# Patient Record
Sex: FEMALE | Race: WHITE | NOT HISPANIC OR LATINO | Employment: OTHER | URBAN - METROPOLITAN AREA
[De-identification: names, ages, dates, MRNs, and addresses within clinical notes are randomized per-mention and may not be internally consistent; named-entity substitution may affect disease eponyms.]

---

## 2024-06-07 ENCOUNTER — HOSPITAL ENCOUNTER (OUTPATIENT)
Dept: RADIOLOGY | Facility: HOSPITAL | Age: 49
Discharge: HOME | End: 2024-06-07
Payer: COMMERCIAL

## 2024-06-07 ENCOUNTER — OFFICE VISIT (OUTPATIENT)
Dept: ORTHOPEDIC SURGERY | Facility: HOSPITAL | Age: 49
End: 2024-06-07
Payer: COMMERCIAL

## 2024-06-07 VITALS — BODY MASS INDEX: 30.16 KG/M2 | WEIGHT: 199 LBS | HEIGHT: 68 IN

## 2024-06-07 DIAGNOSIS — M25.561 BILATERAL ANTERIOR KNEE PAIN: ICD-10-CM

## 2024-06-07 DIAGNOSIS — M22.2X2 PATELLOFEMORAL SYNDROME, BILATERAL: Primary | ICD-10-CM

## 2024-06-07 DIAGNOSIS — M25.562 BILATERAL ANTERIOR KNEE PAIN: ICD-10-CM

## 2024-06-07 DIAGNOSIS — M22.2X1 PATELLOFEMORAL SYNDROME, BILATERAL: Primary | ICD-10-CM

## 2024-06-07 PROCEDURE — 73562 X-RAY EXAM OF KNEE 3: CPT | Mod: RT

## 2024-06-07 PROCEDURE — 73560 X-RAY EXAM OF KNEE 1 OR 2: CPT | Mod: RIGHT SIDE | Performed by: RADIOLOGY

## 2024-06-07 PROCEDURE — 1036F TOBACCO NON-USER: CPT | Performed by: SPECIALIST/TECHNOLOGIST

## 2024-06-07 PROCEDURE — 99204 OFFICE O/P NEW MOD 45 MIN: CPT | Performed by: SPECIALIST/TECHNOLOGIST

## 2024-06-07 PROCEDURE — 73560 X-RAY EXAM OF KNEE 1 OR 2: CPT | Mod: LT

## 2024-06-07 PROCEDURE — 99214 OFFICE O/P EST MOD 30 MIN: CPT | Performed by: SPECIALIST/TECHNOLOGIST

## 2024-06-07 PROCEDURE — 73562 X-RAY EXAM OF KNEE 3: CPT | Mod: RIGHT SIDE | Performed by: RADIOLOGY

## 2024-06-07 RX ORDER — MELOXICAM 15 MG/1
15 TABLET ORAL DAILY
Qty: 14 TABLET | Refills: 0 | Status: SHIPPED | OUTPATIENT
Start: 2024-06-07 | End: 2024-06-21

## 2024-06-07 ASSESSMENT — PAIN SCALES - GENERAL: PAINLEVEL_OUTOF10: 4

## 2024-06-07 ASSESSMENT — PAIN - FUNCTIONAL ASSESSMENT: PAIN_FUNCTIONAL_ASSESSMENT: 0-10

## 2024-06-07 ASSESSMENT — PAIN DESCRIPTION - DESCRIPTORS: DESCRIPTORS: TIGHTNESS

## 2024-06-07 NOTE — PROGRESS NOTES
Chief Complaint: Pain of the Right Knee and Pain of the Left Knee       HPI:  Diane Smith is a 48 y.o. self-employed female presenting to the orthopedic walk-in clinic with bilateral knee pain, right equals left, since December 2023 after beginning a bar workout class.  She reports going 5-6 days/week.  And has continued since December working through the pain.  Today, she reports a tight, sharp ache over the anterior aspect of her knee that worsens with squatting, lunging, stairs, sitting and knee flexion.  She has been performing stretches to try to help relieve her symptoms.  She denies prior injuries to the bilateral knees.  She denies numbness or tingling in the bilateral lower extremities.  She presents for treatment recommendations.    Objective     ROS:  Constitutional: No fever, no chills, not feeling tired, no recent weight gain and no recent weight loss  ENT: No nosebleeds  Cardiovascular: No chest pain  Respiratory: No shortness of breath and no cough  Gastrointestinal: No abdominal pain, no nausea, no diarrhea, and no vomiting  Musculoskeletal: Positive for bilateral knee pain  Integumentary: No rashes and no skin lesions  Neurological: No headache  Psychiatric: No sleep disturbances no depression  Endocrine: No muscle weakness and no muscle cramps  Hematologic/lymphatic: No swelling glands and no tendency for easy bruising    No past medical history on file.     Past Surgical History:   Procedure Laterality Date    OTHER SURGICAL HISTORY  11/09/2022    Dilation and curettage        Social Connections: Not on file          Physical Exam:  General appearance: WN, WD female, in no acute distress  Skin: No rashes, lesions or wounds  Head: Normocephalic, no evidence of trauma  Eye: EOMI, conjunctiva clear, no discharge  ENT: Nares patent  Neck: No abnormal contour, tracheal midline  Chest/lungs: No respiratory distress, speaking in complete sentences  Musculoskeletal: RIGHT KNEE: Tender to palpation  over the medial and lateral joint line, medial patellar border.  Stable Lachman's.  Stable valgus stress test.  Stable varus stress test.  Negative Chang.  She is able to perform an active straight leg raise without difficulty or lag.  Range of motion to 130 degrees.  Trace effusion.  LEFT KNEE: Tender to palpation over the medial and lateral joint line.  Stable Lachman's.  Stable valgus stress test.  Stable varus stress test.  Negative Chang.  She is able to perform an active straight leg raise without difficulty or lag.  Range of motion to 130 degrees.  Trace effusion.  Patellofemoral crepitus noted bilaterally, left greater than right.  No decreased ROM, muscle wasting, rigidity    Neurological: A&O x3, no focal deficits, intact bilateral LE  Psych: normal affect, mood, appearance      Image Results:  X-rays taken on 6/7/2024 were reviewed with the patient in the office today and reveal no acute fractures or dislocations.  There is evidence of mild osteoarthritic changes seen in the medial compartment and the patellofemoral compartments bilaterally.      Assessment/Plan   Encounter Diagnoses:  Patellofemoral syndrome, bilateral    Bilateral anterior knee pain    Orders Placed This Encounter    XR knee right 3 views    XR knee left 1-2 views    Referral to Physical Therapy       The patient and I discussed her clinical presentation and physical exam findings consistent with bilateral patellofemoral syndrome.  We agreed upon conservative management at this time.  Since her symptoms have been persistent since the starting of her workout regimen and continued with 5-6 days/week of working out, I did advise her to slow down her workout regimen and only do it once or twice a week as long as her pain is not increasing.  Additionally we agreed upon meloxicam 15 mg taken daily with food for the next 14 days.  This was sent to her pharmacy for her.  She was provided with a referral to physical therapy to focus on  quadriceps, gluteus, core strength and stability, lower extremity flexibility.  Activity and weight room modifications were discussed with the patient in the office today.  She should avoid stair climber, box jumps, box step ups, lunges, squatting.  I encouraged her to ice her knees 15-20 minutes at a time 2-3 times per day.  I am happy to see her back in the next 6-8 weeks if her symptoms persist or worsen.  She is in agreement this plan.  All of her questions have been answered.    ** This office note was dictated using Dragon voice to text software and was not proofread for spelling or grammatical errors **

## 2024-06-28 ENCOUNTER — EVALUATION (OUTPATIENT)
Dept: PHYSICAL THERAPY | Facility: CLINIC | Age: 49
End: 2024-06-28
Payer: COMMERCIAL

## 2024-06-28 DIAGNOSIS — M22.2X1 PATELLOFEMORAL SYNDROME, BILATERAL: ICD-10-CM

## 2024-06-28 DIAGNOSIS — M25.561 RIGHT KNEE PAIN: Primary | ICD-10-CM

## 2024-06-28 DIAGNOSIS — M22.2X2 PATELLOFEMORAL SYNDROME, BILATERAL: ICD-10-CM

## 2024-06-28 DIAGNOSIS — M25.562 ACUTE PAIN OF LEFT KNEE: ICD-10-CM

## 2024-06-28 PROCEDURE — 97162 PT EVAL MOD COMPLEX 30 MIN: CPT | Mod: GP | Performed by: PHYSICAL THERAPIST

## 2024-06-28 NOTE — PROGRESS NOTES
Physical Therapy Orthopedic Examination Note    Patient Name: Diane Smith  MRN Number: [id number]  Initial Examination Date: 6/28/24  Referring Clinician: Larry Smith PA-C  Reason for Visit: Bilateral knee pain    Time Calculation  Start Time: 0705  Stop Time: 0748  Time Calculation (min): 43 min    Insurance  Visit Number: 1   Approved Visits: 25 then auth  Certification/ POC Period: 6/28/24 - 8/30/24  Coverage: Payor: AETNA / Plan: AETNA  HEALTHCARE / Product Type: *No Product type* /     Precautions/ History  No fall risk.     Problem List  1. Right knee pain  Follow Up In Physical Therapy      2. Acute pain of left knee  Follow Up In Physical Therapy      3. Patellofemoral syndrome, bilateral  Referral to Physical Therapy        Subjective  Right ankle not feel right. Bilateral knee pain. Hamstring tightness. History of plantar fasciitis. Does have some issues with form during backwards lunges with RLE.     Aggravating Factors: exercise.     Observation:  Lateral step downs: adduction with LLE weight bearing    Objective  Other Measures  Other Outcome Measures: LEFS = 30    Knee AROM   Left  Right  Comments  Flexion   WNL  WNL  Extension  WNL  WNL    Hip PROM   Left  Right  Comments  Flexion   WNL  WNL  External rotation  WNL  WNL  Internal rotation WNL  WNL    Hip Strength Testing  Left  Right  Comments  Flexion   4+/5  4+/5  Extension KS  4/5  4/5  Extension KB  4/5  4/5  Abduction  4+/5  4-/5  External rotation  4+/5  4+/5  Internal rotation 4+/5  4/5     Hip Special Test  Left  Right  Comments  Elys    (-)  (-)    Toe Passive ROM  Left  Right   Great Toe  WNL  Hypomobile    Assessment  Referred to physical therapy for complaints of bilateral knee pain and right ankle pain.  Clinical examination consistent with patellofemoral pain syndrome and right great toe tightness is contributing to dysfunction with the ankle during higher level physical activities.  Skilled PT services will emphasize  stretching/mobility, manual care and strengthening in appropriate areas.    Problem List  -Functional limitations/difficulties  -Reduction in lower extremity function  -Bilateral knee pain  -Hip weakness  -Great toe hypomobility    Plan  Check hamstring flexibility.     Planned interventions include: Patient education, Home exercise program, Therapeutic exercise , Manual therapy, and Neuromuscular re-education. Right ankle/ toe manual mobilization, adductor strengthening, rotater strengthening, lateral step downs.     1 Visits/ Week for 8 Weeks    Home Program  To be provided on future sessions.    Goals  - Full return to prior level of function to allow continued working capability and ability to perform normal healthy exercising.  - Increase LEFS outcome measure score, greater than or equal to 65 for clinical improvements in lower body condition.  - Reduce bilateral pain less than or equal to 3/10 maximum in the last week for improvements in quality of life and ability to sleep.  - Increase bilateral hip strength testing greater than or equal to 5-/5 for improvements in LE stability when standing and walking for extended periods of time.    - Full passive right great toe mobility for normalization of movement during exercise.    Plan developed in agreement with patient.

## 2024-07-16 ENCOUNTER — TREATMENT (OUTPATIENT)
Dept: PHYSICAL THERAPY | Facility: CLINIC | Age: 49
End: 2024-07-16
Payer: COMMERCIAL

## 2024-07-16 DIAGNOSIS — M25.562 ACUTE PAIN OF LEFT KNEE: ICD-10-CM

## 2024-07-16 DIAGNOSIS — M25.561 RIGHT KNEE PAIN: ICD-10-CM

## 2024-07-16 PROCEDURE — 97110 THERAPEUTIC EXERCISES: CPT | Mod: GP | Performed by: PHYSICAL THERAPIST

## 2024-07-16 PROCEDURE — 97140 MANUAL THERAPY 1/> REGIONS: CPT | Mod: GP | Performed by: PHYSICAL THERAPIST

## 2024-07-16 NOTE — PROGRESS NOTES
Physical Therapy Orthopedic Examination Note    Patient Name: Diane Smith  MRN Number: [id number]  Initial Examination Date: 6/28/24  Referring Clinician: Larry Smith PA-C  Reason for Visit: Bilateral knee pain    Time Calculation  Start Time: 1034  Stop Time: 1116  Time Calculation (min): 42 min    Insurance  Visit Number: 1   Approved Visits: 25 then auth  Certification/ POC Period: 6/28/24 - 8/30/24  Coverage: Payor: AETNA / Plan: AETNA  HEALTHCARE / Product Type: *No Product type* /     Precautions/ History  No fall risk.     Problem List  1. Right knee pain  Follow Up In Physical Therapy      2. Acute pain of left knee  Follow Up In Physical Therapy        Subjective  A lot of walking last week. Doing BAR less. Right ankle feels feels. Stretching her toe out. Toe of her toe, near the nail feels weird. Can feel some clicking into her foot, then her movement will increase. Stress into her toes. Is working on stretching her toe, sometimes it pops then she has more mobility.     Treatment  Therapeutic Exercise   - hamstring flexibility, WNL   - lunges   - lateral step downs   - great toe self mobilization in half kneeling   - supine single leg bridge   - HEP printed/ issued/ discussed     Manual Therapy   - passive right great toe flexion/ extension   - right MTP AP mobilizations    Assessment  Improved knee and ankle control during lunges and lateral step downs.     Plan  Re-assess current program.     Planned interventions include: Patient education, Home exercise program, Therapeutic exercise , Manual therapy, and Neuromuscular re-education. Right ankle/ toe manual mobilization, adductor strengthening, rotater strengthening, lateral step downs.     1 Visits/ Week for 8 Weeks    Home Program  Access Code: [email or web address]  URL: [email or web address]  Date: 07/16/2024  Prepared by: Jackson Cast    Exercises  - Toe Extension Self-Mobilization  - 3 x weekly - 10-20 reps - 5 sec hold  -  Seated Self Great Toe Stretch  - 3 x weekly - 5 reps - 30 sec hold  - Lateral Step Down  - 3 x weekly - 3 sets - 10-20 reps  - Standard Lunge  - 3 x weekly - 10-20 reps  - Single Leg Bridge  - 3 x weekly - 2 sets - 10-20 reps  - Single Leg Bridge  - 3 x weekly - 2 sets - 10-20 reps    Goals  - Full return to prior level of function to allow continued working capability and ability to perform normal healthy exercising.  - Increase LEFS outcome measure score, greater than or equal to 65 for clinical improvements in lower body condition.  - Reduce bilateral pain less than or equal to 3/10 maximum in the last week for improvements in quality of life and ability to sleep.  - Increase bilateral hip strength testing greater than or equal to 5-/5 for improvements in LE stability when standing and walking for extended periods of time.    - Full passive right great toe mobility for normalization of movement during exercise.    Plan developed in agreement with patient.

## 2024-08-01 ENCOUNTER — TREATMENT (OUTPATIENT)
Dept: PHYSICAL THERAPY | Facility: CLINIC | Age: 49
End: 2024-08-01
Payer: COMMERCIAL

## 2024-08-01 DIAGNOSIS — M25.561 RIGHT KNEE PAIN: ICD-10-CM

## 2024-08-01 DIAGNOSIS — M25.562 ACUTE PAIN OF LEFT KNEE: ICD-10-CM

## 2024-08-01 PROCEDURE — 97140 MANUAL THERAPY 1/> REGIONS: CPT | Mod: GP | Performed by: PHYSICAL THERAPIST

## 2024-08-01 PROCEDURE — 97110 THERAPEUTIC EXERCISES: CPT | Mod: GP | Performed by: PHYSICAL THERAPIST

## 2024-08-01 NOTE — PROGRESS NOTES
Physical Therapy Orthopedic Examination Note    Patient Name: Diane Smith  MRN Number: [id number]  Initial Examination Date: 6/28/24  Referring Clinician: Larry Smith PA-C  Reason for Visit: Bilateral knee pain    Time Calculation  Start Time: 0904  Stop Time: 0946  Time Calculation (min): 42 min    Insurance  Visit Number: 1   Approved Visits: 25 then auth  Certification/ POC Period: 6/28/24 - 8/30/24  Coverage: Payor: AETNA / Plan: AETNA  HEALTHCARE / Product Type: *No Product type* /     Precautions/ History  No fall risk.     Problem List  1. Right knee pain  Follow Up In Physical Therapy      2. Acute pain of left knee  Follow Up In Physical Therapy        Subjective  Not always having numbness in the toe, which is an improvement. Back to planking. Knees are a little bit sore, has not been bothering her knee. Has not tried to ice them. Felt soreness in her knees after excessive amount of squatting in an exercise class. Knees seem to be doing better too, because she is doing less of the workouts. Still having cramping of the hamstrings continues to be an issue.     Treatment  Manual Therapy   - passive right great toe flexion/ extension   - right MTP AP mobilizations   - great toe long axis distraction    Therapeutic Exercise   - lateral step downs, 6 inch box   - lateral step down, 8 inch box   - discussed half kneeling exercise replacing with a squatting toe mobilization   - lunges   - lunges, 10 pound dumbbell on shoulders and by side   - lunges, single UE, no harder than double   - single leg bridge    - discussed foot position in relation to knee, heel in line with superior knee cap on opposite LE   - self PF stretch   - HEP printed/ issued/ discussed     Assessment  Improved form with step backs noted on lunges. Toe mobility improving.    Plan  Continue strengthening and manual care.    Planned interventions include: Patient education, Home exercise program, Therapeutic exercise , Manual therapy,  and Neuromuscular re-education. Right ankle/ toe manual mobilization, adductor strengthening, rotater strengthening, lateral step downs.     1 Visits/ Week for 8 Weeks    Home Program  Access Code: [email or web address]  URL: [email or web address]  Date: 07/16/2024  Prepared by: Jackson Cast    Exercises  - Toe Extension Self-Mobilization  - 3 x weekly - 10-20 reps - 5 sec hold  - Seated Self Great Toe Stretch  - 3 x weekly - 5 reps - 30 sec hold  - Lateral Step Down  - 3 x weekly - 3 sets - 10-20 reps  - Standard Lunge  - 3 x weekly - 10-20 reps  - Single Leg Bridge  - 3 x weekly - 2 sets - 10-20 reps  - Single Leg Bridge  - 3 x weekly - 2 sets - 10-20 reps    Goals  - Full return to prior level of function to allow continued working capability and ability to perform normal healthy exercising.  - Increase LEFS outcome measure score, greater than or equal to 65 for clinical improvements in lower body condition.  - Reduce bilateral pain less than or equal to 3/10 maximum in the last week for improvements in quality of life and ability to sleep.  - Increase bilateral hip strength testing greater than or equal to 5-/5 for improvements in LE stability when standing and walking for extended periods of time.    - Full passive right great toe mobility for normalization of movement during exercise.    Plan developed in agreement with patient.

## 2024-08-07 ENCOUNTER — TREATMENT (OUTPATIENT)
Dept: PHYSICAL THERAPY | Facility: CLINIC | Age: 49
End: 2024-08-07
Payer: COMMERCIAL

## 2024-08-07 DIAGNOSIS — M25.561 RIGHT KNEE PAIN: ICD-10-CM

## 2024-08-07 DIAGNOSIS — M25.562 ACUTE PAIN OF LEFT KNEE: ICD-10-CM

## 2024-08-07 PROCEDURE — 97140 MANUAL THERAPY 1/> REGIONS: CPT | Mod: GP | Performed by: PHYSICAL THERAPIST

## 2024-08-07 PROCEDURE — 97110 THERAPEUTIC EXERCISES: CPT | Mod: GP | Performed by: PHYSICAL THERAPIST

## 2024-08-07 NOTE — PROGRESS NOTES
Physical Therapy Orthopedic Examination Note    Patient Name: Diane Simth  MRN Number: [id number]  Initial Examination Date: 6/28/24  Referring Clinician: Larry Smith PA-C  Reason for Visit: Bilateral knee pain    Time Calculation  Start Time: 1002  Stop Time: 1046  Time Calculation (min): 44 min    Insurance  Visit Number: 4  Approved Visits: 25 then auth  Certification/ POC Period: 6/28/24 - 8/30/24  Coverage: Payor: AETNA / Plan: AETNA  HEALTHCARE / Product Type: *No Product type* /     Precautions/ History  No fall risk.     Problem List  1. Right knee pain  Follow Up In Physical Therapy      2. Acute pain of left knee  Follow Up In Physical Therapy        Subjective  Not always having numbness in the toe, which is an improvement. Back to planking. Knees are a little bit sore. Has not tried to ice them. Felt soreness in her knees after excessive amount of squatting in an exercise class. Knees seem to be doing better too, because she is doing less of the workouts. Still having cramping of the hamstrings continues to be an issue.     Treatment  Manual Therapy   - passive right great toe flexion/ extension   - right MTP AP mobilizations   - great toe long axis distraction    Therapeutic Exercise   - supine hamstring stretch   - supine hip flexor stretch    Assessment  Improved form with step backs noted on lunges. Toe mobility improving.    Plan  Continue strengthening and manual care.    Planned interventions include: Patient education, Home exercise program, Therapeutic exercise , Manual therapy, and Neuromuscular re-education. Right ankle/ toe manual mobilization, adductor strengthening, rotater strengthening, lateral step downs.     1 Visits/ Week for 8 Weeks    Home Program  Access Code: [email or web address]  URL: [email or web address]  Date: 07/16/2024  Prepared by: Jackson Cast    Exercises  - Toe Extension Self-Mobilization  - 3 x weekly - 10-20 reps - 5 sec hold  - Seated Self Great  Toe Stretch  - 3 x weekly - 5 reps - 30 sec hold  - Lateral Step Down  - 3 x weekly - 3 sets - 10-20 reps  - Standard Lunge  - 3 x weekly - 10-20 reps  - Single Leg Bridge  - 3 x weekly - 2 sets - 10-20 reps  - Single Leg Bridge  - 3 x weekly - 2 sets - 10-20 reps  -------------------------------------------------------------------------------------------------------------------------  Access Code: [email or web address]  URL: [email or web address]  Date: 08/7/2024  Prepared by: Jackson Cast    Exercises  - Toe Extension Self-Mobilization  - 3 x weekly - 10-20 reps - 5 sec hold  - Seated Plantar Fascia Stretch  - 3 x weekly - 5 reps - 30 sec hold  - Seated Self Great Toe Stretch  - 3 x weekly - 5 reps - 30 sec hold  - Lateral Step Down  - 3 x weekly - 3 sets - 10-20 reps  - Standard Lunge  - 3 x weekly - 3 sets - 10-20 reps  - Single Leg Bridge  - 3 x weekly - 2 sets - 10-20 reps  - Single Leg Bridge  - 3 x weekly - 2 sets - 10-20 reps  - Supine Hamstring Stretch with Strap  - 3 x weekly - 3 reps - 60 sec hold  - Standing Hip Flexor Stretch  - 3 x weekly - 3 reps - 60 sec hold    Goals  - Full return to prior level of function to allow continued working capability and ability to perform normal healthy exercising.  - Increase LEFS outcome measure score, greater than or equal to 65 for clinical improvements in lower body condition.  - Reduce bilateral pain less than or equal to 3/10 maximum in the last week for improvements in quality of life and ability to sleep.  - Increase bilateral hip strength testing greater than or equal to 5-/5 for improvements in LE stability when standing and walking for extended periods of time.    - Full passive right great toe mobility for normalization of movement during exercise.    Plan developed in agreement with patient.

## 2024-08-15 ENCOUNTER — APPOINTMENT (OUTPATIENT)
Dept: PHYSICAL THERAPY | Facility: CLINIC | Age: 49
End: 2024-08-15
Payer: COMMERCIAL

## 2024-08-21 ENCOUNTER — HOSPITAL ENCOUNTER (OUTPATIENT)
Dept: RADIOLOGY | Facility: CLINIC | Age: 49
Discharge: HOME | End: 2024-08-21
Payer: COMMERCIAL

## 2024-08-21 VITALS — BODY MASS INDEX: 30.17 KG/M2 | WEIGHT: 199.08 LBS | HEIGHT: 68 IN

## 2024-08-21 DIAGNOSIS — Z12.31 SCREENING MAMMOGRAM FOR BREAST CANCER: ICD-10-CM

## 2024-08-21 PROCEDURE — 77067 SCR MAMMO BI INCL CAD: CPT | Performed by: RADIOLOGY

## 2024-08-21 PROCEDURE — 77063 BREAST TOMOSYNTHESIS BI: CPT | Performed by: RADIOLOGY

## 2024-08-21 PROCEDURE — 77067 SCR MAMMO BI INCL CAD: CPT

## 2024-08-22 ENCOUNTER — APPOINTMENT (OUTPATIENT)
Dept: PHYSICAL THERAPY | Facility: CLINIC | Age: 49
End: 2024-08-22
Payer: COMMERCIAL

## 2024-11-17 ASSESSMENT — PROMIS GLOBAL HEALTH SCALE
CARRYOUT_PHYSICAL_ACTIVITIES: COMPLETELY
RATE_AVERAGE_PAIN: 1
RATE_QUALITY_OF_LIFE: VERY GOOD
CARRYOUT_SOCIAL_ACTIVITIES: VERY GOOD
RATE_AVERAGE_FATIGUE: MILD
RATE_MENTAL_HEALTH: VERY GOOD
RATE_SOCIAL_SATISFACTION: VERY GOOD
RATE_PHYSICAL_HEALTH: GOOD
EMOTIONAL_PROBLEMS: SOMETIMES
RATE_GENERAL_HEALTH: VERY GOOD

## 2024-11-18 ENCOUNTER — APPOINTMENT (OUTPATIENT)
Dept: PRIMARY CARE | Facility: CLINIC | Age: 49
End: 2024-11-18
Payer: COMMERCIAL

## 2024-11-18 VITALS
OXYGEN SATURATION: 97 % | DIASTOLIC BLOOD PRESSURE: 84 MMHG | SYSTOLIC BLOOD PRESSURE: 118 MMHG | WEIGHT: 200 LBS | HEIGHT: 68 IN | TEMPERATURE: 98.2 F | BODY MASS INDEX: 30.31 KG/M2 | HEART RATE: 76 BPM

## 2024-11-18 DIAGNOSIS — N95.1 PERIMENOPAUSAL: ICD-10-CM

## 2024-11-18 DIAGNOSIS — N92.6 IRREGULAR MENSES: ICD-10-CM

## 2024-11-18 DIAGNOSIS — Z12.11 COLON CANCER SCREENING: ICD-10-CM

## 2024-11-18 DIAGNOSIS — Z00.00 ROUTINE GENERAL MEDICAL EXAMINATION AT A HEALTH CARE FACILITY: Primary | ICD-10-CM

## 2024-11-18 DIAGNOSIS — E66.811 CLASS 1 OBESITY WITHOUT SERIOUS COMORBIDITY WITH BODY MASS INDEX (BMI) OF 30.0 TO 30.9 IN ADULT, UNSPECIFIED OBESITY TYPE: ICD-10-CM

## 2024-11-18 PROCEDURE — 1036F TOBACCO NON-USER: CPT | Performed by: FAMILY MEDICINE

## 2024-11-18 PROCEDURE — 3008F BODY MASS INDEX DOCD: CPT | Performed by: FAMILY MEDICINE

## 2024-11-18 PROCEDURE — 99396 PREV VISIT EST AGE 40-64: CPT | Performed by: FAMILY MEDICINE

## 2024-11-18 ASSESSMENT — ENCOUNTER SYMPTOMS
LOSS OF SENSATION IN FEET: 0
DEPRESSION: 0
OCCASIONAL FEELINGS OF UNSTEADINESS: 0

## 2024-11-18 ASSESSMENT — PATIENT HEALTH QUESTIONNAIRE - PHQ9
2. FEELING DOWN, DEPRESSED OR HOPELESS: NOT AT ALL
1. LITTLE INTEREST OR PLEASURE IN DOING THINGS: NOT AT ALL
SUM OF ALL RESPONSES TO PHQ9 QUESTIONS 1 AND 2: 0

## 2024-11-18 NOTE — PATIENT INSTRUCTIONS
Immunizations recommended:  --Flu shot every fall.  --Shingrix is the shingles vaccine, and can be done after the age of 50.  --Tetanus every 10 years. Done in 2022.  --Covid vaccine.    If your pap smears have been normal, you can do them every 3-5 years, and stop after the age of 65.  Done in 2022    Colonoscopy should be done every 10 years after the age of 45, unless there was a previous abnormality, or family history of colon cancer.  Alternatives would be Cologuard every 3 years (looks for genetic evidence of colon cancer in stool), or stool FIT stool test yearly (looks for microscopic blood in stool)  FIT given today    Mammogram screening recommendations are changing, but at this time, I recommend a mammogram every 1-2 years in your 40's, and yearly after the age of 50.  Having a family history of breast cancer may change that.  Done in Aug 2024.    You should try to get 150 minutes of exercise weekly.  Be sure to eat a diet rich in fruits and vegetables, and low in saturated fats and sodium.  (You have been doing well with this)    Make sure to wear sun screen when outside, and check for changing or unusual moles.    Pre-menopause recommendations are for 1000 mg calcium and 1000 units vitamin D3 daily.  After menopause calcium recommendation is 1200 mg, with 1000 units of vitamin D3    I recommend you get a Living Will and Durable Power of  for Healthcare. These documents state what care you would want if you couldn't speak for yourself. They help your loved ones in caring for you if that happens.   Once you have those forms completed, you can keep a copy on file with your doctor.    Refer to gynecologist for possible HRT for menopausal symptoms    Check fasting blood work for lipids and glucose.  Will also check thyroid.  Fast for 12 hours prior to having blood drawn.  You should drink plenty of water, and can have black tea or black coffee, if you'd like.

## 2024-11-18 NOTE — PROGRESS NOTES
"Subjective   Patient ID: Diane Smith is a 49 y.o. female who presents for Annual Exam.  Well Adult Physical   Patient here for a comprehensive physical exam.The patient reports no problems     History:  LMP: No LMP recorded.  Periods irregular, longer and heavier.  Varies 21-30 days.  Is having some warm flashes, can wake her up.  No sleeping through the night.  Is napping more.  Last pap date:   Abnormal pap? no  : 3  Para: 2    .  [employer]es [employer].    Left ankle sprain, in boot.    Is exercising regularly, strength and aerobics.  Reduced  sugar in diet.  Frustrated with continued weight issues.    Was having some knee pain.  Saw PT.    Father had heart blockage, in his 70's.  Diet was not heart healthy.                    Review of Systems   All other systems reviewed and are negative.      Objective   /84 (BP Location: Left arm, Patient Position: Sitting, BP Cuff Size: Adult)   Pulse 76   Temp 36.8 °C (98.2 °F) (Temporal)   Ht 1.727 m (5' 8\")   Wt 90.7 kg (200 lb)   SpO2 97%   BMI 30.41 kg/m²     Physical Exam  Vitals reviewed.   Constitutional:       General: She is not in acute distress.     Appearance: Normal appearance.   HENT:      Head: Normocephalic and atraumatic.   Eyes:      Pupils: Pupils are equal, round, and reactive to light.   Cardiovascular:      Rate and Rhythm: Normal rate and regular rhythm.      Heart sounds: Normal heart sounds. No murmur heard.  Pulmonary:      Effort: Pulmonary effort is normal.      Breath sounds: Normal breath sounds.   Abdominal:      General: Bowel sounds are normal.      Palpations: Abdomen is soft. There is no mass.      Tenderness: There is no abdominal tenderness.   Musculoskeletal:      Cervical back: No rigidity.   Lymphadenopathy:      Cervical: No cervical adenopathy.   Skin:     General: Skin is warm and dry.   Neurological:      Mental Status: She is alert. Mental status is at baseline.      Gait: Gait " normal.   Psychiatric:         Mood and Affect: Mood normal.         Behavior: Behavior normal.         Thought Content: Thought content normal.         Judgment: Judgment normal.           Assessment/Plan   Problem List Items Addressed This Visit    None  Visit Diagnoses       Routine general medical examination at a health care facility    -  Primary    Relevant Orders    TSH with reflex to Free T4 if abnormal    Glucose    Lipid Panel    Colon cancer screening        Relevant Orders    Fecal Occult Blood Immunoassay    Class 1 obesity without serious comorbidity with body mass index (BMI) of 30.0 to 30.9 in adult, unspecified obesity type        Perimenopausal        Relevant Orders    Referral to Gynecology    Irregular menses        Relevant Orders    Referral to Gynecology

## 2024-12-10 ENCOUNTER — LAB (OUTPATIENT)
Dept: LAB | Facility: LAB | Age: 49
End: 2024-12-10
Payer: COMMERCIAL

## 2024-12-10 ENCOUNTER — APPOINTMENT (OUTPATIENT)
Dept: OBSTETRICS AND GYNECOLOGY | Facility: CLINIC | Age: 49
End: 2024-12-10
Payer: COMMERCIAL

## 2024-12-10 VITALS — WEIGHT: 206 LBS | BODY MASS INDEX: 31.32 KG/M2 | SYSTOLIC BLOOD PRESSURE: 138 MMHG | DIASTOLIC BLOOD PRESSURE: 70 MMHG

## 2024-12-10 DIAGNOSIS — Z00.00 ROUTINE GENERAL MEDICAL EXAMINATION AT A HEALTH CARE FACILITY: ICD-10-CM

## 2024-12-10 DIAGNOSIS — N95.1 PERIMENOPAUSAL: ICD-10-CM

## 2024-12-10 DIAGNOSIS — N95.1 PERIMENOPAUSAL: Primary | ICD-10-CM

## 2024-12-10 DIAGNOSIS — N95.1 PERIMENOPAUSAL VASOMOTOR SYMPTOMS: ICD-10-CM

## 2024-12-10 LAB
25(OH)D3 SERPL-MCNC: 17 NG/ML (ref 30–100)
ANION GAP SERPL CALC-SCNC: 13 MMOL/L (ref 10–20)
BUN SERPL-MCNC: 11 MG/DL (ref 6–23)
CALCIUM SERPL-MCNC: 9.2 MG/DL (ref 8.6–10.6)
CHLORIDE SERPL-SCNC: 107 MMOL/L (ref 98–107)
CHOLEST SERPL-MCNC: 161 MG/DL (ref 0–199)
CHOLESTEROL/HDL RATIO: 2.4
CO2 SERPL-SCNC: 24 MMOL/L (ref 21–32)
CREAT SERPL-MCNC: 0.72 MG/DL (ref 0.5–1.05)
EGFRCR SERPLBLD CKD-EPI 2021: >90 ML/MIN/1.73M*2
ERYTHROCYTE [DISTWIDTH] IN BLOOD BY AUTOMATED COUNT: 16.2 % (ref 11.5–14.5)
FERRITIN SERPL-MCNC: 21 NG/ML (ref 8–150)
GLUCOSE SERPL-MCNC: 86 MG/DL (ref 74–99)
HCT VFR BLD AUTO: 39.7 % (ref 36–46)
HDLC SERPL-MCNC: 66.5 MG/DL
HGB BLD-MCNC: 12.4 G/DL (ref 12–16)
IRON SATN MFR SERPL: 20 % (ref 25–45)
IRON SERPL-MCNC: 85 UG/DL (ref 35–150)
LDLC SERPL CALC-MCNC: 80 MG/DL
MAGNESIUM SERPL-MCNC: 2.37 MG/DL (ref 1.6–2.4)
MCH RBC QN AUTO: 26.3 PG (ref 26–34)
MCHC RBC AUTO-ENTMCNC: 31.2 G/DL (ref 32–36)
MCV RBC AUTO: 84 FL (ref 80–100)
NON HDL CHOLESTEROL: 95 MG/DL (ref 0–149)
NRBC BLD-RTO: 0 /100 WBCS (ref 0–0)
PLATELET # BLD AUTO: 327 X10*3/UL (ref 150–450)
POTASSIUM SERPL-SCNC: 4.6 MMOL/L (ref 3.5–5.3)
RBC # BLD AUTO: 4.71 X10*6/UL (ref 4–5.2)
SODIUM SERPL-SCNC: 139 MMOL/L (ref 136–145)
TIBC SERPL-MCNC: 426 UG/DL (ref 240–445)
TRIGL SERPL-MCNC: 72 MG/DL (ref 0–149)
TSH SERPL-ACNC: 1.09 MIU/L (ref 0.44–3.98)
UIBC SERPL-MCNC: 341 UG/DL (ref 110–370)
VLDL: 14 MG/DL (ref 0–40)
WBC # BLD AUTO: 5.3 X10*3/UL (ref 4.4–11.3)

## 2024-12-10 PROCEDURE — 82172 ASSAY OF APOLIPOPROTEIN: CPT

## 2024-12-10 PROCEDURE — 82728 ASSAY OF FERRITIN: CPT

## 2024-12-10 PROCEDURE — 1036F TOBACCO NON-USER: CPT | Performed by: STUDENT IN AN ORGANIZED HEALTH CARE EDUCATION/TRAINING PROGRAM

## 2024-12-10 PROCEDURE — 83550 IRON BINDING TEST: CPT

## 2024-12-10 PROCEDURE — 99204 OFFICE O/P NEW MOD 45 MIN: CPT | Performed by: STUDENT IN AN ORGANIZED HEALTH CARE EDUCATION/TRAINING PROGRAM

## 2024-12-10 PROCEDURE — 36415 COLL VENOUS BLD VENIPUNCTURE: CPT

## 2024-12-10 PROCEDURE — 83695 ASSAY OF LIPOPROTEIN(A): CPT

## 2024-12-10 PROCEDURE — 83540 ASSAY OF IRON: CPT

## 2024-12-10 PROCEDURE — 80048 BASIC METABOLIC PNL TOTAL CA: CPT

## 2024-12-10 PROCEDURE — 84630 ASSAY OF ZINC: CPT

## 2024-12-10 PROCEDURE — 83735 ASSAY OF MAGNESIUM: CPT

## 2024-12-10 PROCEDURE — 82306 VITAMIN D 25 HYDROXY: CPT

## 2024-12-10 PROCEDURE — 84443 ASSAY THYROID STIM HORMONE: CPT

## 2024-12-10 PROCEDURE — 80061 LIPID PANEL: CPT

## 2024-12-10 PROCEDURE — 85027 COMPLETE CBC AUTOMATED: CPT

## 2024-12-10 RX ORDER — ESTRADIOL/NORETHINDRONE ACETATE TRANSDERMAL SYSTEM .05; .25 MG/D; MG/D
1 PATCH, EXTENDED RELEASE TRANSDERMAL 2 TIMES WEEKLY
Qty: 8 PATCH | Refills: 11 | Status: SHIPPED | OUTPATIENT
Start: 2024-12-12 | End: 2025-12-12

## 2024-12-10 ASSESSMENT — ENCOUNTER SYMPTOMS
GASTROINTESTINAL NEGATIVE: 0
MUSCULOSKELETAL NEGATIVE: 0
ALLERGIC/IMMUNOLOGIC NEGATIVE: 0
PSYCHIATRIC NEGATIVE: 0
EYES NEGATIVE: 0
CONSTITUTIONAL NEGATIVE: 0
RESPIRATORY NEGATIVE: 0
HEMATOLOGIC/LYMPHATIC NEGATIVE: 0
ENDOCRINE NEGATIVE: 0
CARDIOVASCULAR NEGATIVE: 0
NEUROLOGICAL NEGATIVE: 0

## 2024-12-10 NOTE — PROGRESS NOTES
Subjective   Patient ID: Diane Smith is a 49 y.o. female who presents for Menopause (Patient here for management of menopause symptoms,//Patient complains of night sweats, irritability, memory fog, dry skin, tinnitus, inability to lose weight with increased activity, lightheadedness, anxiety, tiredness, restless at night, new facial hair, crawling feeling under skin. Vaginal dryness//Patient has not had FSH, LH and TSH labs done.// ).  50yo P2 in perimenopause presenting for HRT consultation    Patient has been experiencing fatigue tinnitus, hot flashes, night sweats, irritability, difficulty with weight management.   Has done a lot of research on menopause and tried lifestyle change prior to presenting for medical management. She has been exercising almost daily and cutting out sugar for one year which resulted in 10lb weight loss, but has struggled to see any further improvement.   Also requesting lab work.     Menstrual Pattern: monthly, slight change in length  Vasomotor Symptoms: significant for years  Vaginal Symptoms: some dryness but uses librication, difficulty with orgasm but feels this is modifiable  Mood: irritable, not significant to patient  CAD/VTE/Breast Ca/Abnl Bleeding/Liver Disease/Endo Ca: denies      Review of Systems   All other systems reviewed and are negative.    Objective   Physical Exam  Vitals reviewed.   Constitutional:       Appearance: Normal appearance.   HENT:      Head: Normocephalic and atraumatic.      Mouth/Throat:      Mouth: Mucous membranes are moist.   Eyes:      Extraocular Movements: Extraocular movements intact.      Conjunctiva/sclera: Conjunctivae normal.      Pupils: Pupils are equal, round, and reactive to light.   Cardiovascular:      Rate and Rhythm: Normal rate.   Pulmonary:      Effort: Pulmonary effort is normal.   Abdominal:      Palpations: Abdomen is soft.   Musculoskeletal:         General: Normal range of motion.      Cervical back: Normal range of motion  and neck supple.   Skin:     General: Skin is warm and dry.   Neurological:      General: No focal deficit present.      Mental Status: She is alert.   Psychiatric:         Mood and Affect: Mood normal.         Behavior: Behavior normal.         Thought Content: Thought content normal.         Judgment: Judgment normal.         Assessment/Plan   Problem List Items Addressed This Visit    None  Visit Diagnoses         Codes    Perimenopausal    -  Primary N95.1    Relevant Orders    Basic metabolic panel    CBC    Ferritin    Iron and TIBC    Magnesium    Zinc, Serum or Plasma    Vitamin D 25-Hydroxy,Total (for eval of Vitamin D levels)    TSH with reflex to Free T4 if abnormal    Apolipoprotein B    Lipoprotein a    Perimenopausal vasomotor symptoms     N95.1    Relevant Medications    estradiol-norethindrone acet (CombiPatch) 0.05-0.25 mg/24 hr (Start on 12/12/2024)    Other Relevant Orders    Basic metabolic panel    CBC    Ferritin    Iron and TIBC    Magnesium    Zinc, Serum or Plasma    Vitamin D 25-Hydroxy,Total (for eval of Vitamin D levels)    TSH with reflex to Free T4 if abnormal    Apolipoprotein B    Lipoprotein a          We consider the initiation of MHT to be a safe option for healthy, symptomatic women who are within 10 years of menopause or younger than age 60 years and who do not have contraindications to MHT    Contraindications to MHT include a history of breast cancer, CHD, a previous venous thromboembolic (VTE) event or stroke, active liver disease, unexplained vaginal bleeding, high-risk endometrial cancer, or transient ischemic attack    Our approach is to choose initial therapy based upon the woman's predominant symptom. If the main concern is depression and hot flashes are not severe, we start with an SSRI. On the other hand, if vasomotor symptoms are the major symptom and depression or mood symptoms are mild, we start with MHT. For women in whom depression and vasomotor symptoms are both  severe, we start both estrogen and an SSRI and refer to a psychopharmacologist for further consultation and monitoring.       Plan for CombiPatch with RTC in 3 months for follow up. Patient provided precautions for worsening symptoms and mood disturbance.     Letty Quiroga MD 12/10/24 10:20 AM

## 2024-12-11 DIAGNOSIS — E55.9 VITAMIN D DEFICIENCY: Primary | ICD-10-CM

## 2024-12-11 RX ORDER — VIT C/E/ZN/COPPR/LUTEIN/ZEAXAN 250MG-90MG
25 CAPSULE ORAL DAILY
Qty: 30 CAPSULE | Refills: 11 | Status: SHIPPED | OUTPATIENT
Start: 2024-12-11 | End: 2025-12-11

## 2024-12-12 LAB
APO B100 SERPL-MCNC: 75 MG/DL (ref 60–117)
LPA SERPL-MCNC: 11 MG/DL
ZINC SERPL-MCNC: 69.9 UG/DL (ref 60–120)

## 2025-01-30 DIAGNOSIS — Z12.11 COLON CANCER SCREENING: Primary | ICD-10-CM

## 2025-03-20 ENCOUNTER — APPOINTMENT (OUTPATIENT)
Dept: OBSTETRICS AND GYNECOLOGY | Facility: CLINIC | Age: 50
End: 2025-03-20
Payer: COMMERCIAL

## 2025-03-20 VITALS
SYSTOLIC BLOOD PRESSURE: 120 MMHG | BODY MASS INDEX: 32.58 KG/M2 | WEIGHT: 215 LBS | HEIGHT: 68 IN | DIASTOLIC BLOOD PRESSURE: 80 MMHG

## 2025-03-20 DIAGNOSIS — N95.1 PERIMENOPAUSAL VASOMOTOR SYMPTOMS: ICD-10-CM

## 2025-03-20 PROCEDURE — 99214 OFFICE O/P EST MOD 30 MIN: CPT | Performed by: STUDENT IN AN ORGANIZED HEALTH CARE EDUCATION/TRAINING PROGRAM

## 2025-03-20 PROCEDURE — 3008F BODY MASS INDEX DOCD: CPT | Performed by: STUDENT IN AN ORGANIZED HEALTH CARE EDUCATION/TRAINING PROGRAM

## 2025-03-20 RX ORDER — PROGESTERONE 100 MG/1
CAPSULE ORAL
Qty: 90 CAPSULE | Refills: 3 | Status: SHIPPED | OUTPATIENT
Start: 2025-03-20 | End: 2025-03-20

## 2025-03-20 RX ORDER — PROGESTERONE 200 MG/1
CAPSULE ORAL
Qty: 42 CAPSULE | Refills: 0 | Status: SHIPPED | OUTPATIENT
Start: 2025-03-20

## 2025-03-20 RX ORDER — ESTRADIOL 0.07 MG/D
1 PATCH TRANSDERMAL WEEKLY
Qty: 12 PATCH | Refills: 3 | Status: SHIPPED | OUTPATIENT
Start: 2025-03-20 | End: 2026-03-20

## 2025-03-20 ASSESSMENT — PAIN SCALES - GENERAL: PAINLEVEL_OUTOF10: 0-NO PAIN

## 2025-03-20 NOTE — PROGRESS NOTES
Subjective   Patient ID: Diane Smith is a 49 y.o. female who presents for Follow-up (3 month follow up).  Has been using CombiPatch for 3 months. Has seen improvement in sleep but still having night sweats. Had small daily vaginal spotting almost every day in Feb. Has not lost weight but satisfied with improvement in strength training. Believes needs increase in dosing for optimization.         Review of Systems   All other systems reviewed and are negative.      Objective   Physical Exam  Vitals reviewed.   Constitutional:       Appearance: Normal appearance.   HENT:      Head: Normocephalic and atraumatic.      Mouth/Throat:      Mouth: Mucous membranes are moist.   Eyes:      Extraocular Movements: Extraocular movements intact.      Conjunctiva/sclera: Conjunctivae normal.      Pupils: Pupils are equal, round, and reactive to light.   Cardiovascular:      Rate and Rhythm: Normal rate.   Pulmonary:      Effort: Pulmonary effort is normal.   Abdominal:      Palpations: Abdomen is soft.   Musculoskeletal:         General: Normal range of motion.      Cervical back: Normal range of motion and neck supple.   Skin:     General: Skin is warm and dry.   Neurological:      General: No focal deficit present.      Mental Status: She is alert.   Psychiatric:         Mood and Affect: Mood normal.         Behavior: Behavior normal.         Thought Content: Thought content normal.         Judgment: Judgment normal.         Assessment/Plan   Problem List Items Addressed This Visit             ICD-10-CM    Perimenopausal vasomotor symptoms N95.1    Relevant Medications    estradiol (Climara) 0.075 mg/24 hr patch    progesterone (Prometrium) 200 mg capsule     Given need for increased estrogen dosing, will switch to Climara + cyclical prometrium. RTC in 3 months for follow up.      Letty Quiroga MD 03/20/25 9:51 AM

## 2025-05-22 ENCOUNTER — APPOINTMENT (OUTPATIENT)
Dept: OBSTETRICS AND GYNECOLOGY | Facility: CLINIC | Age: 50
End: 2025-05-22
Payer: COMMERCIAL

## 2025-05-22 DIAGNOSIS — N95.1 PERIMENOPAUSAL VASOMOTOR SYMPTOMS: ICD-10-CM

## 2025-05-22 DIAGNOSIS — F52.31 FEMALE ORGASMIC DISORDER: Primary | ICD-10-CM

## 2025-05-22 PROCEDURE — 99214 OFFICE O/P EST MOD 30 MIN: CPT | Performed by: STUDENT IN AN ORGANIZED HEALTH CARE EDUCATION/TRAINING PROGRAM

## 2025-05-22 RX ORDER — PROGESTERONE 100 MG/1
100 CAPSULE ORAL NIGHTLY
Qty: 90 CAPSULE | Refills: 3 | Status: SHIPPED | OUTPATIENT
Start: 2025-05-22 | End: 2026-05-22

## 2025-05-22 RX ORDER — ESTRADIOL 1 MG/1
1 TABLET ORAL DAILY
Qty: 90 TABLET | Refills: 3 | Status: SHIPPED | OUTPATIENT
Start: 2025-05-22 | End: 2026-05-22

## 2025-05-22 RX ORDER — ESTRADIOL 0.1 MG/G
2 CREAM VAGINAL NIGHTLY
Qty: 34 G | Refills: 3 | Status: SHIPPED | OUTPATIENT
Start: 2025-05-22 | End: 2026-05-22

## 2025-05-22 NOTE — PROGRESS NOTES
Subjective   Patient ID: Diane Smith is a 49 y.o. female who presents for HRT fuv.   Has not had significant symptom improvement with increased estrogen dosing, still having night disturbances. Had also been very bothered by adhesive for Climara as it is not sticking. She called the  to discuss solutions and there were none as she is very physically active 5x/week and sweating during workouts. Would prefer to not have patch method any longer.   She has lost about 5 lbs, which is exciting to her. Has more energy.  Also bothersome has been her issue with achieving orgasm. Requires toys during intercourse. No concerns with arousal or sensation.     Tx Hx:   - Dec 2024-March 2025: Combipatch 0.05  - March 2025: May 2025: climara 0.75 + cyclical Prometrium        Review of Systems   All other systems reviewed and are negative.      Objective   Physical Exam  Vitals reviewed.   Constitutional:       Appearance: Normal appearance.   HENT:      Head: Normocephalic and atraumatic.      Mouth/Throat:      Mouth: Mucous membranes are moist.   Eyes:      Extraocular Movements: Extraocular movements intact.      Conjunctiva/sclera: Conjunctivae normal.      Pupils: Pupils are equal, round, and reactive to light.   Cardiovascular:      Rate and Rhythm: Normal rate.   Pulmonary:      Effort: Pulmonary effort is normal.   Abdominal:      Palpations: Abdomen is soft.   Musculoskeletal:         General: Normal range of motion.      Cervical back: Normal range of motion and neck supple.   Skin:     General: Skin is warm and dry.   Neurological:      General: No focal deficit present.      Mental Status: She is alert.   Psychiatric:         Mood and Affect: Mood normal.         Behavior: Behavior normal.         Thought Content: Thought content normal.         Judgment: Judgment normal.         Assessment/Plan   Problem List Items Addressed This Visit           ICD-10-CM    Perimenopausal vasomotor symptoms N95.1     Relevant Medications    progesterone (Prometrium) 100 mg capsule    estradiol (Estrace) 1 mg tablet     Will increasing estrogen to 1mg and change formulations to oral. Prometrium to be daily at this time.   Discussed trialing vaginal estrogen vs Addyi for orgasmic disorder. Although patient no longer consumes alcohol, she is concerned about the risk of adverse sympts (dizziness & drowsiness) so she prefers a trial of of vaginal estrogen at this time.     RTC in 3 months for follow up.        Letty Quiroga MD 05/22/25 12:37 PM

## 2025-06-18 ENCOUNTER — APPOINTMENT (OUTPATIENT)
Dept: OBSTETRICS AND GYNECOLOGY | Facility: CLINIC | Age: 50
End: 2025-06-18
Payer: COMMERCIAL

## 2025-06-24 ENCOUNTER — PATIENT MESSAGE (OUTPATIENT)
Dept: OBSTETRICS AND GYNECOLOGY | Facility: CLINIC | Age: 50
End: 2025-06-24

## 2025-06-24 ENCOUNTER — APPOINTMENT (OUTPATIENT)
Dept: OBSTETRICS AND GYNECOLOGY | Facility: CLINIC | Age: 50
End: 2025-06-24
Payer: COMMERCIAL

## 2025-06-24 VITALS
SYSTOLIC BLOOD PRESSURE: 118 MMHG | DIASTOLIC BLOOD PRESSURE: 82 MMHG | BODY MASS INDEX: 31.83 KG/M2 | WEIGHT: 210 LBS | HEIGHT: 68 IN

## 2025-06-24 DIAGNOSIS — R53.83 FATIGUE, UNSPECIFIED TYPE: ICD-10-CM

## 2025-06-24 DIAGNOSIS — E66.09 CLASS 1 OBESITY DUE TO EXCESS CALORIES WITHOUT SERIOUS COMORBIDITY WITH BODY MASS INDEX (BMI) OF 31.0 TO 31.9 IN ADULT: ICD-10-CM

## 2025-06-24 DIAGNOSIS — N95.1 PERIMENOPAUSAL VASOMOTOR SYMPTOMS: Primary | ICD-10-CM

## 2025-06-24 DIAGNOSIS — E66.811 CLASS 1 OBESITY DUE TO EXCESS CALORIES WITHOUT SERIOUS COMORBIDITY WITH BODY MASS INDEX (BMI) OF 31.0 TO 31.9 IN ADULT: ICD-10-CM

## 2025-06-24 DIAGNOSIS — F52.31 FEMALE ORGASMIC DISORDER: ICD-10-CM

## 2025-06-24 DIAGNOSIS — N95.1 PERIMENOPAUSAL VASOMOTOR SYMPTOMS: ICD-10-CM

## 2025-06-24 PROCEDURE — 3008F BODY MASS INDEX DOCD: CPT | Performed by: STUDENT IN AN ORGANIZED HEALTH CARE EDUCATION/TRAINING PROGRAM

## 2025-06-24 PROCEDURE — 99214 OFFICE O/P EST MOD 30 MIN: CPT | Performed by: STUDENT IN AN ORGANIZED HEALTH CARE EDUCATION/TRAINING PROGRAM

## 2025-06-24 PROCEDURE — 1036F TOBACCO NON-USER: CPT | Performed by: STUDENT IN AN ORGANIZED HEALTH CARE EDUCATION/TRAINING PROGRAM

## 2025-06-24 RX ORDER — PROGESTERONE 100 MG/1
100 CAPSULE ORAL NIGHTLY
Qty: 90 CAPSULE | Refills: 3 | Status: SHIPPED | OUTPATIENT
Start: 2025-06-24 | End: 2026-06-24

## 2025-06-24 RX ORDER — ESTRADIOL 0.1 MG/G
2 CREAM VAGINAL NIGHTLY
Qty: 34 G | Refills: 3 | Status: SHIPPED | OUTPATIENT
Start: 2025-06-24 | End: 2026-06-24

## 2025-06-24 RX ORDER — ESTRADIOL 1 MG/1
1 TABLET ORAL DAILY
Qty: 90 TABLET | Refills: 3 | Status: SHIPPED | OUTPATIENT
Start: 2025-06-24 | End: 2026-06-24

## 2025-06-24 NOTE — PROGRESS NOTES
Subjective   Patient ID: Diane Smith is a 49 y.o. female who presents for est patient visit (3 month follow up).  Feels dose is working well. No longer has sweats or breast tenderness. Vaginal spotting has also resolved. Only has had two nighttime awakenings in 1 month.   Working a lot on fitness and nutrition. Is wondering if she is doing too much fitness and may be why she is tired.  Has not yet tried vag estrogen as her  was laid off and she wanted to conserve funds for now.     Tx Hx:   - Dec 2024-March 2025: Combipatch 0.05  - March 2025- May 2025: climara 0.75 + cyclical Prometrium  - May 2025-June 2025: PO Estrace + daily Prometrium (switched from patch for adhesive issues) & vaginal estrogen for orgasmic disorder (declined Addyi for concerns over possible side effects)          Review of Systems   All other systems reviewed and are negative.      Objective   Physical Exam  Vitals reviewed.   Constitutional:       Appearance: Normal appearance.   HENT:      Head: Normocephalic and atraumatic.      Mouth/Throat:      Mouth: Mucous membranes are moist.   Eyes:      Extraocular Movements: Extraocular movements intact.      Conjunctiva/sclera: Conjunctivae normal.      Pupils: Pupils are equal, round, and reactive to light.   Cardiovascular:      Rate and Rhythm: Normal rate.   Pulmonary:      Effort: Pulmonary effort is normal.   Abdominal:      Palpations: Abdomen is soft.   Musculoskeletal:         General: Normal range of motion.      Cervical back: Normal range of motion and neck supple.   Skin:     General: Skin is warm and dry.   Neurological:      General: No focal deficit present.      Mental Status: She is alert.   Psychiatric:         Mood and Affect: Mood normal.         Behavior: Behavior normal.         Thought Content: Thought content normal.         Judgment: Judgment normal.         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        Letty Quiroga MD 06/24/25 11:40 AM

## 2025-06-25 LAB
25(OH)D3+25(OH)D2 SERPL-MCNC: 38 NG/ML (ref 30–100)
ANION GAP SERPL CALCULATED.4IONS-SCNC: 8 MMOL/L (CALC) (ref 7–17)
BUN SERPL-MCNC: 16 MG/DL (ref 7–25)
BUN/CREAT SERPL: NORMAL (CALC) (ref 6–22)
CALCIUM SERPL-MCNC: 9.1 MG/DL (ref 8.6–10.2)
CHLORIDE SERPL-SCNC: 106 MMOL/L (ref 98–110)
CO2 SERPL-SCNC: 24 MMOL/L (ref 20–32)
CREAT SERPL-MCNC: 0.77 MG/DL (ref 0.5–0.99)
EGFRCR SERPLBLD CKD-EPI 2021: 95 ML/MIN/1.73M2
ERYTHROCYTE [DISTWIDTH] IN BLOOD BY AUTOMATED COUNT: 15.3 % (ref 11–15)
FERRITIN SERPL-MCNC: 9 NG/ML (ref 16–232)
GLUCOSE SERPL-MCNC: 92 MG/DL (ref 65–99)
HCT VFR BLD AUTO: 42.5 % (ref 35–45)
HGB BLD-MCNC: 13 G/DL (ref 11.7–15.5)
IRON SATN MFR SERPL: 15 % (CALC) (ref 16–45)
IRON SERPL-MCNC: 59 MCG/DL (ref 40–190)
MAGNESIUM SERPL-MCNC: 2.3 MG/DL (ref 1.5–2.5)
MCH RBC QN AUTO: 27.3 PG (ref 27–33)
MCHC RBC AUTO-ENTMCNC: 30.6 G/DL (ref 32–36)
MCV RBC AUTO: 89.3 FL (ref 80–100)
PLATELET # BLD AUTO: 382 THOUSAND/UL (ref 140–400)
PMV BLD REES-ECKER: 10.3 FL (ref 7.5–12.5)
POTASSIUM SERPL-SCNC: 4.7 MMOL/L (ref 3.5–5.3)
RBC # BLD AUTO: 4.76 MILLION/UL (ref 3.8–5.1)
SODIUM SERPL-SCNC: 138 MMOL/L (ref 135–146)
TIBC SERPL-MCNC: 398 MCG/DL (CALC) (ref 250–450)
WBC # BLD AUTO: 6.4 THOUSAND/UL (ref 3.8–10.8)

## 2025-06-26 ENCOUNTER — TELEMEDICINE CLINICAL SUPPORT (OUTPATIENT)
Dept: NUTRITION | Facility: CLINIC | Age: 50
End: 2025-06-26
Payer: COMMERCIAL

## 2025-06-26 VITALS — HEIGHT: 68 IN | BODY MASS INDEX: 31.93 KG/M2

## 2025-06-26 DIAGNOSIS — E66.09 CLASS 1 OBESITY DUE TO EXCESS CALORIES WITHOUT SERIOUS COMORBIDITY WITH BODY MASS INDEX (BMI) OF 31.0 TO 31.9 IN ADULT: ICD-10-CM

## 2025-06-26 DIAGNOSIS — E66.811 CLASS 1 OBESITY DUE TO EXCESS CALORIES WITHOUT SERIOUS COMORBIDITY WITH BODY MASS INDEX (BMI) OF 31.0 TO 31.9 IN ADULT: ICD-10-CM

## 2025-06-26 DIAGNOSIS — R53.83 FATIGUE, UNSPECIFIED TYPE: Primary | ICD-10-CM

## 2025-06-26 PROCEDURE — 97802 MEDICAL NUTRITION INDIV IN: CPT | Mod: 95

## 2025-06-26 NOTE — PATIENT INSTRUCTIONS
Follow the Healthy Plate Method at meals- see handout.  - -Check out [email or web address] or [email or web address] for Mediterranean meal plans and recipes ideas.    When eating starchy foods, try to eat whole grains like potato with the skin, whole grain breads and cereals, brown rices and pastas.  Limit portions of carbs to 1 cup or less per meal.   Include protein with all meals and snacks.  Lean protein are better for cholesterol levels such as chicken(no skin), fish (baked or broiled or grilled), low-fat dairy, eggs, and peanut butter or nuts.    - For high protein snack ideas check out: [email or web address]/10-high-protein-snacks-that-keep-you-feeling-full-longer/  - Protein drinks between meals such as Orgain shakes or OWYN can help curb appetite.  4.   Aim to lose 1# per week to reach your goal weight.      - Consider tracking your calorie intake on an pepe such as Websp[employer], PredicSis or [employer]Online to track your calories.  Aim for 1500 calories per day.    - Track macronutrients and aim for 30% of calories from protein(115g), 40% of calories from carbohydrate, and 30% from fat.   5.   Increase fiber to 25-35g per day to help keep you mariano and lower cholesterol levels.  You may consider a fiber supplement such as Benefiber or Metamucil everyday.    6.   Aim to drink  oz of water daily  7.   Continue your excellent exercise routine

## 2025-06-26 NOTE — PROGRESS NOTES
"Nutrition Assessment     Reason for Visit:  Diane Smith is a 49 y.o. female who presents for No chief complaint on file.  Virtual or Telephone Consent    While technically available, the patient was unable or unwilling to consent to connect via audio/video telehealth technology; therefore, I performed this visit using a real-time audio only connection between Diane Smith & Sarah Senior RD, LD.  Verbal consent was requested and obtained from Diane Smith on this date, 06/26/25 for a telehealth visit and the patient's location was confirmed at the time of the visit.;  Anthropometrics:  Wt Readings from Last 10 Encounters:   06/24/25 95.3 kg (210 lb)   03/20/25 97.5 kg (215 lb)   12/10/24 93.4 kg (206 lb)   11/18/24 90.7 kg (200 lb)   08/21/24 90.3 kg (199 lb 1.2 oz)   06/07/24 90.3 kg (199 lb)   11/09/22 95.3 kg (210 lb 2 oz)     Anthropometrics  Height: 172.7 cm (5' 8\")    Body mass index is 31.93 kg/m².    Lab Results   Component Value Date    CHOL 161 12/10/2024    CHOL 149 11/09/2022     Lab Results   Component Value Date    HDL 66.5 12/10/2024    HDL 58.6 11/09/2022     Lab Results   Component Value Date    LDLCALC 80 12/10/2024     Lab Results   Component Value Date    TRIG 72 12/10/2024    TRIG 89 11/09/2022     Food And Nutrient Intake:  Food and Nutrient History  Food and Nutrient History: Pt presents for nutrition counseling seeking weight management.  Pt is in perimenopause and struggling to lower weight despite following a well-balanced, high protein diet.  Pt states that she has been working out 6-7 days per week with a healthy mix of cardio and weight training.  She has noticed improvement in her muscle tone and fit of her clothing but is frustrated with the numbers on her scale.  Diet recall showed healthy routines and meal choices.  Encourage pt to focus more on her size and energy levels as opposed to the number on the scale.  Suggest she track her intakes occasionally and looks at " "macronutrients.  Recommend continuing to follow the Healthy Plate model.     Food Intake  Meal 1: breakfast: Orgain protein powder shake with almond milk- 33g of protein, 11g fiber  Meal 2: lunch: Protein smoothie or leftovers  Snacks : Greek yogurt with granola, berries, string cheese with crackers, hummus and vegetables or rice cakes and peanut butter, dark chocolate  Meal 3: dinner: Increasing green vegetables; protein; carbs; brussel sprouts        Physical Activities:    Physical Activity  Frequency (times/week): 5 (times/week)  Duration (minutes/day): 60 minutes/day  Type of Physical Activity: [employer] classes  Nutrition Focused Physical Exam:  Deferred- telehealth visit       Energy Needs  Calculated Energy Needs Using Equations  Height: 172.7 cm (5' 8\")  Weight Used for Equation Calculations: 95.3 kg (210 lb)  Yamile Tabares Equation (Overweight or Obese Patients): 1626  Activity Factor: 1.4  Total Energy Needs: 2276  Estimated Energy Needs  Total Energy Estimated Needs in 24 hours (kCal): 1526 kCal  Method for Estimating Needs: MSJ x 1.4-750 kcals  Estimated Protein Needs  Total Protein Estimated Needs in 24 Hours (g): 115 g  Method for Estimating 24 Hour Protein Needs: 30% of est calories    Nutrition Diagnosis   Malnutrition Diagnosis  Patient has Malnutrition Diagnosis: No  Nutrition Diagnosis  Patient has Nutrition Diagnosis: Yes  Diagnosis Status (1): New  Nutrition Diagnosis 1: Obesity class I  Related to (1): etiology unknown  As Evidenced by (1): pt interview; diet interview    Nutrition Interventions/Recommendations   Nutrition Prescription  Individualized Nutrition Prescription Provided for : Oral nutrition  Individualized Nutrition Prescription Provided for : 1500 kcals, 115g of protein, Healthy Plate model  Individualized Nutrition Prescription Provided for : 1500 kcals, 115g of protein, Healthy Plate model  Food and Nutrition Delivery  Meals & Snacks: General Healthful Diet, " Protein-modified diet, Fiber-modified diet  Goals: Healthy Plate model; 25-35g of fiber; 115g g of protein per day  Nutrition Education  Nutrition Education Content: Content related nutrition education, Education on nutrition's influence on health, Physical activity guidance  Goals: Instructed on counting macronutrient intake, proper portion sizes, label reading and general healthful nutrition. Discussed mindful eating, slow gradual wt loss and goals beyond wt. Instructed pt to not skip meals - discussed this may lead to over eating later or snacking more than planned. Instructed on importance of physical activity for wt loss and maintenance of wt loss. Both verbal and written education provided in the one-on-one setting. Pt verbalized understanding of information provided. All questions answered to pt satisfaction throughout visit    Patient Instructions   Follow the Healthy Plate Method at meals- see handout.  - -Check out [employer] or [employer] for Mediterranean meal plans and recipes ideas.    When eating starchy foods, try to eat whole grains like potato with the skin, whole grain breads and cereals, brown rices and pastas.  Limit portions of carbs to 1 cup or less per meal.   Include protein with all meals and snacks.  Lean protein are better for cholesterol levels such as chicken(no skin), fish (baked or broiled or grilled), low-fat dairy, eggs, and peanut butter or nuts.    - For high protein snack ideas check out: [email or web address]-high-protein-snacks-that-keep-you-feeling-full-longer/  - Protein drinks between meals such as Orgain shakes or OWYN can help curb appetite.  4.   Aim to lose 1# per week to reach your goal weight.      - Consider tracking your calorie intake on an pepe such as Jeevespal, FoodEnerMotionte or LoseIt to track your calories.  Aim for 1500 calories per day.    - Track macronutrients and aim for 30% of calories from protein(115g), 40% of calories from  carbohydrate, and 30% from fat.   5.   Increase fiber to 25-35g per day to help keep you mariano and lower cholesterol levels.  You may consider a fiber supplement such as Benefiber or Metamucil everyday.    6.   Aim to drink  oz of water daily  7.   Continue your excellent exercise routine      Nutrition Monitoring and Evaluation   Food and Nutrient Intake  Monitoring and Evaluation Plan: Energy intake, Meal/snack pattern, Fiber intake, Carbohydrate intake, Amount of food, Fluid intake, Protein intake  Criteria: 1500 kcal/day  Criteria: aim for at least 64 oz of water daily  Criteria: Aim for 3 meals/day with 1-2 snack  Meal/Snack Pattern: Food variety, Estimated meal and snack pattern  Criteria: Follow plate method when planning meals (1/2 plate non-starchy vegetables, 1/4 plate lean protein, 1/4 plate carbohydrates).  Criteria: Choose lean protein sources including chicken, turkey, seafood, and eggs. Be sure to include protein with each meal and snack~ aim for 1500 kcals/day  Criteria: Limit added sugar to less than 25 g per day  Criteria: Increase fiber from fruits, vegetables, whole grains, and beans/lentils  Additional Plan: Provided pt with the following handouts: wt loss tips, high proteins foods list, snack list, plate method  Knowledge Belief Attitude Determination   Monitoring and Evaluation Plan: Physical activity  Criteria: Encouraged regular physical activity including strength training as medically appropriate per AHA guidelines  Anthropometric measurements  Monitoring and Evaluation Plan: Weight change  Criteria: 1-2 lb wt loss per week    Readiness to Change : Good  Level of Understanding : Good  Anticipated Compliant : Good

## 2025-07-01 ENCOUNTER — APPOINTMENT (OUTPATIENT)
Dept: OBSTETRICS AND GYNECOLOGY | Facility: CLINIC | Age: 50
End: 2025-07-01
Payer: COMMERCIAL

## 2025-12-02 ENCOUNTER — APPOINTMENT (OUTPATIENT)
Dept: OBSTETRICS AND GYNECOLOGY | Facility: CLINIC | Age: 50
End: 2025-12-02
Payer: COMMERCIAL